# Patient Record
Sex: MALE | NOT HISPANIC OR LATINO | Employment: OTHER | URBAN - METROPOLITAN AREA
[De-identification: names, ages, dates, MRNs, and addresses within clinical notes are randomized per-mention and may not be internally consistent; named-entity substitution may affect disease eponyms.]

---

## 2023-10-04 PROBLEM — Z79.4 TYPE 2 DIABETES MELLITUS WITHOUT COMPLICATION, WITH LONG-TERM CURRENT USE OF INSULIN (MULTI): Status: ACTIVE | Noted: 2023-10-04

## 2023-10-04 PROBLEM — F84.0 AUTISTIC DISORDER (HHS-HCC): Status: ACTIVE | Noted: 2023-10-04

## 2023-10-04 PROBLEM — I10 BENIGN ESSENTIAL HYPERTENSION: Status: ACTIVE | Noted: 2023-10-04

## 2023-10-04 PROBLEM — E11.9 TYPE 2 DIABETES MELLITUS WITHOUT COMPLICATION, WITH LONG-TERM CURRENT USE OF INSULIN (MULTI): Status: ACTIVE | Noted: 2023-10-04

## 2023-10-04 RX ORDER — SIMVASTATIN 40 MG/1
40 TABLET, FILM COATED ORAL DAILY
COMMUNITY

## 2023-10-04 RX ORDER — MULTIVIT WITH MINERALS/HERBS
1 TABLET ORAL DAILY
COMMUNITY

## 2023-10-04 RX ORDER — METFORMIN HYDROCHLORIDE EXTENDED-RELEASE TABLETS 1000 MG/1
1000 TABLET, FILM COATED, EXTENDED RELEASE ORAL 2 TIMES DAILY
COMMUNITY

## 2023-10-04 RX ORDER — ASPIRIN 81 MG/1
81 TABLET ORAL DAILY
COMMUNITY

## 2023-10-04 RX ORDER — LISINOPRIL 10 MG/1
10 TABLET ORAL DAILY
COMMUNITY

## 2023-10-04 RX ORDER — FERROUS SULFATE 325(65) MG
325 TABLET ORAL DAILY
COMMUNITY

## 2023-10-04 RX ORDER — DAPAGLIFLOZIN 10 MG/1
10 TABLET, FILM COATED ORAL DAILY
COMMUNITY

## 2023-10-04 RX ORDER — METOPROLOL TARTRATE 50 MG/1
50 TABLET ORAL 2 TIMES DAILY
COMMUNITY

## 2023-10-04 RX ORDER — FENOFIBRATE 134 MG/1
CAPSULE ORAL
COMMUNITY
End: 2024-01-04 | Stop reason: WASHOUT

## 2023-10-04 RX ORDER — INSULIN DEGLUDEC 100 U/ML
7 INJECTION, SOLUTION SUBCUTANEOUS DAILY
COMMUNITY

## 2023-10-05 ENCOUNTER — APPOINTMENT (OUTPATIENT)
Dept: CARDIOLOGY | Facility: CLINIC | Age: 53
End: 2023-10-05
Payer: MEDICAID

## 2023-10-24 ENCOUNTER — CLINICAL SUPPORT (OUTPATIENT)
Dept: AUDIOLOGY | Facility: CLINIC | Age: 53
End: 2023-10-24
Payer: MEDICAID

## 2023-10-24 DIAGNOSIS — F84.0 AUTISM (HHS-HCC): ICD-10-CM

## 2023-10-24 DIAGNOSIS — R94.120 ABNORMAL OTOACOUSTIC EMISSIONS TEST: ICD-10-CM

## 2023-10-24 DIAGNOSIS — H91.90 HEARING LOSS, UNSPECIFIED HEARING LOSS TYPE, UNSPECIFIED LATERALITY: Primary | ICD-10-CM

## 2023-10-24 PROCEDURE — 92579 VISUAL AUDIOMETRY (VRA): CPT | Performed by: AUDIOLOGIST

## 2023-10-24 NOTE — LETTER
October 24, 2023     Romaine Berman MD  [address]  [address]  [facility] [address]    Patient: Kuldeep Dougherty   YOB: 1970   Date of Visit: 10/24/2023       Dear Dr. Romaine Berman MD:    Thank you for referring Kuldeep Dougherty to me for evaluation. Below are my notes for this consultation.  If you have questions, please do not hesitate to call me. I look forward to following your patient along with you.       Sincerely,     Sarah J Lombardo, SETH, CCC-A      CC: No Recipients  ______________________________________________________________________________________    Kuldeep Dougherty, age 52 years, is here today for a hearing re-evaluation. He is accompanied by a caregiver from his group home (diagnosed autism spectrum disorder).  Testing was attempted 9- via VRA (Visual Reinforcement Audiometry) without success.  Threshold ABR (Auditory Brainstem Response) was recommended if family/facility wanted to determine the type, configuration, and severity of hearing loss (the caregiver did not feel he would respond behaviorally). Caregiver also indicated that Kuldeep would not tolerate hearing aids if hearing loss was diagnosed.    Difficulty hearing - unsure  Tinnitus - unsure  Excessive noise exposure - unsure  Chronic ear infections - unsure  Ear pain - yes, both ears  Ear drainage - yes, excessive wax in both ears  Past ear surgery - unsure  Vertigo - no  Dizziness - no  Past hearing aid use - no  Family history - unsure    We do not have ABR equipment at this facility, so behavioral hearing evaluation was again attempted.    Appointment time: 9:30-10:15    Otoscopy revealed non-occluding wax bilaterally. Ears recently cleaned by Dr Berman.    Behavioral hearing evaluation revealed borderline normal to mild unspecified hearing loss in at least the better ear via VRA with fair reliability. Thresholds likely better than indicated as patient was not actively listening for the sounds, did not understand the  directions, and lost interest in stimuli quickly     Speech reception thresholds obtained at a normal levels bilaterally (15 dB HL).    Word discrimination:  Right ear - excellent (96%)  Left ear - excellent (96%)    Tympanometry:  Right ear - not tested (already performed 9-)  Left ear - not tested (already performed 9-)    Ipsilateral acoustic reflexes: not tested (already performed 9-)    Otoacoustic emissions:  Right ear - not tested (already performed 9-)  Left ear - not tested (already performed 9-)    Impressions:  Able to communicate via normal conversational speech based on speech audiometry scores (excellent word discrimination scores and normal speech reception thresholds bilaterally)    Recommendations:  1) Annual behavioral hearing evaluation to monitor.

## 2023-10-24 NOTE — PROGRESS NOTES
Kuldeep Dougherty, age 52 years, is here today for a hearing re-evaluation. He is accompanied by a caregiver from his group home (diagnosed autism spectrum disorder).  Testing was attempted 9- via VRA (Visual Reinforcement Audiometry) without success.  Threshold ABR (Auditory Brainstem Response) was recommended if family/facility wanted to determine the type, configuration, and severity of hearing loss (the caregiver did not feel he would respond behaviorally). Caregiver also indicated that Kuldeep would not tolerate hearing aids if hearing loss was diagnosed.    Difficulty hearing - unsure  Tinnitus - unsure  Excessive noise exposure - unsure  Chronic ear infections - unsure  Ear pain - yes, both ears  Ear drainage - yes, excessive wax in both ears  Past ear surgery - unsure  Vertigo - no  Dizziness - no  Past hearing aid use - no  Family history - unsure    We do not have ABR equipment at this facility, so behavioral hearing evaluation was again attempted.    Appointment time: 9:30-10:15    Otoscopy revealed non-occluding wax bilaterally. Ears recently cleaned by Dr Berman.    Behavioral hearing evaluation revealed borderline normal to mild unspecified hearing loss in at least the better ear via VRA with fair reliability. Thresholds likely better than indicated as patient was not actively listening for the sounds, did not understand the directions, and lost interest in stimuli quickly     Speech reception thresholds obtained at a normal levels bilaterally (15 dB HL).    Word discrimination:  Right ear - excellent (96%)  Left ear - excellent (96%)    Tympanometry:  Right ear - not tested (already performed 9-)  Left ear - not tested (already performed 9-)    Ipsilateral acoustic reflexes: not tested (already performed 9-)    Otoacoustic emissions:  Right ear - not tested (already performed 9-)  Left ear - not tested (already performed 9-)    Impressions:  Able to communicate via  normal conversational speech based on speech audiometry scores (excellent word discrimination scores and normal speech reception thresholds bilaterally)    Recommendations:  1) Annual behavioral hearing evaluation to monitor.

## 2023-10-26 ENCOUNTER — APPOINTMENT (OUTPATIENT)
Dept: CARDIOLOGY | Facility: CLINIC | Age: 53
End: 2023-10-26
Payer: MEDICAID

## 2023-11-01 ENCOUNTER — OFFICE VISIT (OUTPATIENT)
Dept: NEUROLOGY | Facility: CLINIC | Age: 53
End: 2023-11-01
Payer: MEDICAID

## 2023-11-01 VITALS
HEART RATE: 64 BPM | WEIGHT: 191 LBS | SYSTOLIC BLOOD PRESSURE: 130 MMHG | HEIGHT: 72 IN | BODY MASS INDEX: 25.87 KG/M2 | DIASTOLIC BLOOD PRESSURE: 74 MMHG

## 2023-11-01 DIAGNOSIS — F84.0 AUTISTIC DISORDER (HHS-HCC): ICD-10-CM

## 2023-11-01 DIAGNOSIS — E11.3293 TYPE 2 DIABETES MELLITUS WITH BOTH EYES AFFECTED BY MILD NONPROLIFERATIVE RETINOPATHY WITHOUT MACULAR EDEMA, WITHOUT LONG-TERM CURRENT USE OF INSULIN (MULTI): ICD-10-CM

## 2023-11-01 PROCEDURE — 3075F SYST BP GE 130 - 139MM HG: CPT | Performed by: PSYCHIATRY & NEUROLOGY

## 2023-11-01 PROCEDURE — 99203 OFFICE O/P NEW LOW 30 MIN: CPT | Performed by: PSYCHIATRY & NEUROLOGY

## 2023-11-01 PROCEDURE — 3078F DIAST BP <80 MM HG: CPT | Performed by: PSYCHIATRY & NEUROLOGY

## 2023-11-01 PROCEDURE — 4010F ACE/ARB THERAPY RXD/TAKEN: CPT | Performed by: PSYCHIATRY & NEUROLOGY

## 2023-11-01 PROCEDURE — 1036F TOBACCO NON-USER: CPT | Performed by: PSYCHIATRY & NEUROLOGY

## 2023-11-01 RX ORDER — ACETAMINOPHEN 325 MG/1
325 TABLET ORAL EVERY 4 HOURS PRN
COMMUNITY
Start: 2023-07-06

## 2023-11-01 RX ORDER — CHOLECALCIFEROL (VITAMIN D3) 25 MCG
1000 TABLET ORAL DAILY
COMMUNITY
Start: 2023-10-20

## 2023-11-01 RX ORDER — FENOFIBRATE 134 MG/1
134 CAPSULE ORAL DAILY
COMMUNITY

## 2023-11-01 RX ORDER — GLIMEPIRIDE 2 MG/1
2 TABLET ORAL 2 TIMES DAILY
COMMUNITY
Start: 2023-08-04

## 2023-11-01 ASSESSMENT — ENCOUNTER SYMPTOMS
COUGH: 0
CHILLS: 0
AGITATION: 0
FEVER: 0
CONSTIPATION: 0
FATIGUE: 0
DIFFICULTY URINATING: 0
EYE DISCHARGE: 0
ADENOPATHY: 0
DIAPHORESIS: 0
SHORTNESS OF BREATH: 0
WOUND: 0
POLYPHAGIA: 0
BACK PAIN: 0
DIARRHEA: 0
ARTHRALGIAS: 0

## 2023-11-01 ASSESSMENT — VISUAL ACUITY: VA_NORMAL: 1

## 2023-11-01 NOTE — PROGRESS NOTES
Consults  Chief complaints: Tremors.    History Of Present Illness  Kuldeep Dougherty is a 52 y.o. male presenting with autism and DM- 2.    Today he is her for episodic tremors but I am unable to appreciate any tremors or shaking. He is smiling and echolalia and no major deficits except he has chronic spastic arm movements.     Past Medical and Surgical History    Social History  Social History     Tobacco Use    Smoking status: Never    Smokeless tobacco: Never   Substance Use Topics    Alcohol use: Never    Drug use: Never     Allergies  Bactrim [sulfamethoxazole-trimethoprim]  (Not in a hospital admission)      Review of Systems   Constitutional:  Negative for chills, diaphoresis, fatigue and fever.   HENT:  Negative for congestion.    Eyes:  Negative for discharge.   Respiratory:  Negative for cough and shortness of breath.    Cardiovascular:  Negative for chest pain.   Gastrointestinal:  Negative for constipation and diarrhea.   Endocrine: Negative for polyphagia.   Genitourinary:  Negative for difficulty urinating.   Musculoskeletal:  Negative for arthralgias and back pain.   Skin:  Negative for pallor and wound.   Allergic/Immunologic: Negative for environmental allergies.   Hematological:  Negative for adenopathy.   Psychiatric/Behavioral:  Negative for agitation and behavioral problems.         Autism spectrum disorder     Face: agitation related face scratch marks.     Cardiovascular system: S1-S2 intact  Respiratory clear to auscultation bilateral on deep breathing he feels irritability on the left side of the chest.    Neurological Exam  Mental Status  Awake and alert. Patient is nonverbal. Follows two-step commands. Able to perform serial calculations.    Cranial Nerves  CN II: Visual acuity is normal. Visual fields full to confrontation.  CN III, IV, VI: Extraocular movements intact bilaterally. Normal lids and orbits bilaterally. Pupils equal round and reactive to light bilaterally.  CN V: Facial sensation  is normal.  CN VII: Full and symmetric facial movement.  CN VIII: Hearing is normal.  CN IX, X: Palate elevates symmetrically. Normal gag reflex.  CN XI: Shoulder shrug strength is normal.  CN XII: Tongue midline without atrophy or fasciculations.    Motor  Normal muscle bulk throughout. Increased muscle tone. Spasticity in upper and lower extremities. Strength is 5/5 throughout all four extremities.    Sensory  Light touch is normal in upper and lower extremities. Pinprick is normal in upper and lower extremities. Temperature is normal in upper and lower extremities. Vibration is normal in upper and lower extremities.     Reflexes                                            Right                      Left  Brachioradialis                    2+                         2+  Biceps                                 2+                         2+  Triceps                                2+                         2+  Patellar                                3+                         3+  Achilles                                2+                         2+    Coordination    Unable to follow .    Gait  Casual gait:  Spastic gait.        Last Recorded Vitals  Blood pressure 130/74, pulse 64, height 1.829 m (6'), weight 86.6 kg (191 lb).    Relevant Results      Current Outpatient Medications:     aspirin 81 mg EC tablet, 1 tablet (81 mg) once daily., Disp: , Rfl:     dapagliflozin propanediol (Farxiga) 10 mg, Take 1 tablet (10 mg) by mouth once daily., Disp: , Rfl:     ferrous sulfate (FerrouSuL) 325 (65 Fe) MG tablet, Take 1 tablet (325 mg) by mouth once daily., Disp: , Rfl:     insulin degludec (Tresiba U-100 Insulin) 100 unit/mL injection, Inject 7 Units under the skin once daily. Take as directed per insulin instructions., Disp: , Rfl:     fenofibrate micronized (Lofibra) 134 mg capsule, , Disp: , Rfl:     lisinopril 10 mg tablet, , Disp: , Rfl:     metFORMIN, OSM, (Fortamet) 1,000 mg 24 hr tablet, Do not crush, chew, or  split., Disp: , Rfl:     metoprolol tartrate (Lopressor) 50 mg tablet, , Disp: , Rfl:     simvastatin (Zocor) 40 mg tablet, , Disp: , Rfl:     vitamin B complex tablet, , Disp: , Rfl:      Continuous medications    PRN medications, reviewed                  I have personally reviewed the following imaging for brain (CT/ MR) and results and discussed in detail with the patient/care giver/family     Assessment/Plan     I am unable to appreciate any tremors today but we can see him again six month to see if symptoms are episodic or chronic and progressive     The health care giver -  feels when he get agitated he feel more shaking but not today. He should be seen by psych to get second opinion about anxiety or mood related shaking or tremors.    Followup with psychiatrist for mood disorder    Patient/Family Education: Extensive time was spent educating the patient on relevant anatomy, clinical findings and imaging, as well as discussing the potential diagnoses as discussed above.  Pharmacology: as above. Exercise: I discussed the importance of maintaining a daily exercise program, including stretching and strengthening. Preventative strategies were reviewed, specifically avoidance of any exercises that exacerbate pain.Return to online virtual visit/ clinic visit for follow-up with REESE Syed in 6 months or sooner as needed.The patient expressed understanding and agreement with the assessment and plan.  Patient encouraged to contact us should they have any questions, concerns, or any changes in symptoms. Thank you for allowing me to participate in the care of your patient.** This note is created using speech recognition transcription software. Despite proofreading, several typographical errors might be present that might affect the meaning of the content. Please call with any questions.**          Glen Young MD

## 2023-11-16 ENCOUNTER — APPOINTMENT (OUTPATIENT)
Dept: CARDIOLOGY | Facility: CLINIC | Age: 53
End: 2023-11-16
Payer: MEDICAID

## 2023-12-07 ENCOUNTER — APPOINTMENT (OUTPATIENT)
Dept: CARDIOLOGY | Facility: CLINIC | Age: 53
End: 2023-12-07
Payer: MEDICAID

## 2024-01-04 ENCOUNTER — OFFICE VISIT (OUTPATIENT)
Dept: CARDIOLOGY | Facility: CLINIC | Age: 54
End: 2024-01-04
Payer: MEDICAID

## 2024-01-04 VITALS
HEIGHT: 71 IN | WEIGHT: 186.6 LBS | RESPIRATION RATE: 16 BRPM | OXYGEN SATURATION: 96 % | BODY MASS INDEX: 26.12 KG/M2 | HEART RATE: 76 BPM | SYSTOLIC BLOOD PRESSURE: 108 MMHG | DIASTOLIC BLOOD PRESSURE: 76 MMHG

## 2024-01-04 DIAGNOSIS — I10 BENIGN ESSENTIAL HYPERTENSION: ICD-10-CM

## 2024-01-04 PROCEDURE — 3074F SYST BP LT 130 MM HG: CPT | Performed by: INTERNAL MEDICINE

## 2024-01-04 PROCEDURE — 99204 OFFICE O/P NEW MOD 45 MIN: CPT | Performed by: INTERNAL MEDICINE

## 2024-01-04 PROCEDURE — 3078F DIAST BP <80 MM HG: CPT | Performed by: INTERNAL MEDICINE

## 2024-01-04 PROCEDURE — 93005 ELECTROCARDIOGRAM TRACING: CPT | Performed by: INTERNAL MEDICINE

## 2024-01-04 PROCEDURE — 1036F TOBACCO NON-USER: CPT | Performed by: INTERNAL MEDICINE

## 2024-01-04 PROCEDURE — 4010F ACE/ARB THERAPY RXD/TAKEN: CPT | Performed by: INTERNAL MEDICINE

## 2024-01-04 PROCEDURE — 93010 ELECTROCARDIOGRAM REPORT: CPT | Performed by: INTERNAL MEDICINE

## 2024-01-04 PROCEDURE — 99214 OFFICE O/P EST MOD 30 MIN: CPT | Performed by: INTERNAL MEDICINE

## 2024-01-04 ASSESSMENT — ENCOUNTER SYMPTOMS
VOMITING: 0
MEMORY LOSS: 0
FEVER: 0
NAUSEA: 0
DIARRHEA: 0
DEPRESSION: 0
MYALGIAS: 0
CHILLS: 0
BLOATING: 0
HEMATURIA: 0
DYSURIA: 0
ALTERED MENTAL STATUS: 0
COUGH: 0
CONSTIPATION: 0
HEADACHES: 0
ABDOMINAL PAIN: 0
FALLS: 0
HEMOPTYSIS: 0
WHEEZING: 0

## 2024-01-04 NOTE — PROGRESS NOTES
Chief complaint:  New Patient Visit ([facility])     HPI  53 yo WM w h/o HTN, HLD, DM, autism now here for cardiology consult. No chest pain. No dyspnea at rest. No HERNANDEZ. No orthopnea/PND. No palps. No LH/dizzy/syncope. No edema. No claudication. No cough.   ECG 9/23: SR (67), normal    Review of Systems   Constitutional: Negative for chills, fever and malaise/fatigue.   HENT:  Negative for hearing loss.    Eyes:  Negative for visual disturbance.   Respiratory:  Negative for cough, hemoptysis and wheezing.    Skin:  Negative for rash.   Musculoskeletal:  Negative for falls and myalgias.   Gastrointestinal:  Negative for bloating, abdominal pain, constipation, diarrhea, dysphagia, nausea and vomiting.   Genitourinary:  Negative for dysuria and hematuria.   Neurological:  Negative for headaches.   Psychiatric/Behavioral:  Negative for altered mental status, depression and memory loss.         Social History     Tobacco Use    Smoking status: Never    Smokeless tobacco: Never   Substance Use Topics    Alcohol use: Never        Family History   Problem Relation Name Age of Onset    No Known Problems Mother      No Known Problems Father          Allergies   Allergen Reactions    Bactrim [Sulfamethoxazole-Trimethoprim] Unknown        Current Outpatient Medications   Medication Instructions    acetaminophen (TYLENOL) 325 mg, oral, Every 4 hours PRN    aspirin 81 mg, Daily    cholecalciferol (VITAMIN D-3) 1,000 Units, oral, Daily    dapagliflozin propanediol (FARXIGA) 10 mg, oral, Daily    fenofibrate micronized (Lofibra) 134 mg capsule No dose, route, or frequency recorded.    fenofibrate micronized (LOFIBRA) 134 mg, oral, Daily    ferrous sulfate (325 mg ferrous sulfate) (FERROUSUL) 325 mg, oral, Daily    glimepiride (AMARYL) 2 mg, oral, 2 times daily    insulin degludec (TRESIBA U-100 INSULIN) 7 Units, subcutaneous, Daily, Take as directed per insulin instructions.    lisinopril 10 mg, oral, Daily    metFORMIN  (OSM) (FORTAMET) 1,000 mg, oral, 2 times daily, Do not crush, chew, or split.    metoprolol tartrate (LOPRESSOR) 50 mg, oral, 2 times daily    simvastatin (ZOCOR) 40 mg, oral, Daily    vitamin B complex tablet 1 tablet, oral, Daily        Vitals:    01/04/24 1026   BP: 108/76   Pulse: 76   Resp: 16   SpO2: 96%        Physical Exam  Constitutional:       Appearance: Normal appearance.   HENT:      Head: Normocephalic and atraumatic.      Nose: Nose normal.   Neck:      Vascular: No carotid bruit.   Cardiovascular:      Rate and Rhythm: Normal rate and regular rhythm.      Heart sounds: No murmur heard.  Pulmonary:      Effort: Pulmonary effort is normal.      Breath sounds: Normal breath sounds.   Abdominal:      Palpations: Abdomen is soft.      Tenderness: There is no abdominal tenderness.   Musculoskeletal:      Right lower leg: No edema.      Left lower leg: No edema.   Skin:     General: Skin is warm and dry.   Neurological:      General: No focal deficit present.      Mental Status: He is alert.   Psychiatric:         Mood and Affect: Mood normal.     Labs  5/23 Cr 1.69, K 4.5, LFT nl  8/22 Cr 1.17, K 4.2  2/21 LDL 62    Assessment/Plan   53 yo WM w h/o HTN, HLD, DM, autism. Doing well. No sig cardiac symptoms. ECG normal. Can consider CT cardiac score to determine ASA need.   -continue Metoprolol 50 bid  -continue Lisinopril 10 every day  -continue Simva 40 every day  -FU MARY GRACE Hernandez MD

## 2024-01-08 LAB
ATRIAL RATE: 74 BPM
P AXIS: 67 DEGREES
P OFFSET: 187 MS
P ONSET: 138 MS
PR INTERVAL: 162 MS
Q ONSET: 219 MS
QRS COUNT: 13 BEATS
QRS DURATION: 88 MS
QT INTERVAL: 374 MS
QTC CALCULATION(BAZETT): 415 MS
QTC FREDERICIA: 401 MS
R AXIS: 64 DEGREES
T AXIS: 66 DEGREES
T OFFSET: 406 MS
VENTRICULAR RATE: 74 BPM

## 2024-05-01 ENCOUNTER — APPOINTMENT (OUTPATIENT)
Dept: NEUROLOGY | Facility: CLINIC | Age: 54
End: 2024-05-01
Payer: MEDICAID

## 2024-07-30 ENCOUNTER — APPOINTMENT (OUTPATIENT)
Dept: NEUROLOGY | Facility: CLINIC | Age: 54
End: 2024-07-30
Payer: MEDICAID

## 2024-07-30 VITALS
TEMPERATURE: 97.6 F | BODY MASS INDEX: 26.6 KG/M2 | SYSTOLIC BLOOD PRESSURE: 122 MMHG | DIASTOLIC BLOOD PRESSURE: 70 MMHG | HEART RATE: 74 BPM | WEIGHT: 190 LBS | HEIGHT: 71 IN

## 2024-07-30 DIAGNOSIS — F84.0 AUTISTIC DISORDER (HHS-HCC): Primary | ICD-10-CM

## 2024-07-30 PROCEDURE — 1036F TOBACCO NON-USER: CPT | Performed by: STUDENT IN AN ORGANIZED HEALTH CARE EDUCATION/TRAINING PROGRAM

## 2024-07-30 PROCEDURE — 99213 OFFICE O/P EST LOW 20 MIN: CPT | Performed by: STUDENT IN AN ORGANIZED HEALTH CARE EDUCATION/TRAINING PROGRAM

## 2024-07-30 PROCEDURE — 3008F BODY MASS INDEX DOCD: CPT | Performed by: STUDENT IN AN ORGANIZED HEALTH CARE EDUCATION/TRAINING PROGRAM

## 2024-07-30 PROCEDURE — 3078F DIAST BP <80 MM HG: CPT | Performed by: STUDENT IN AN ORGANIZED HEALTH CARE EDUCATION/TRAINING PROGRAM

## 2024-07-30 PROCEDURE — 3074F SYST BP LT 130 MM HG: CPT | Performed by: STUDENT IN AN ORGANIZED HEALTH CARE EDUCATION/TRAINING PROGRAM

## 2024-07-30 PROCEDURE — 4010F ACE/ARB THERAPY RXD/TAKEN: CPT | Performed by: STUDENT IN AN ORGANIZED HEALTH CARE EDUCATION/TRAINING PROGRAM

## 2024-07-30 RX ORDER — MELATONIN 5 MG
5 CAPSULE ORAL DAILY
COMMUNITY

## 2024-07-30 NOTE — PROGRESS NOTES
Date of Service: 7/30/2024  Patient: Kuldeep Dougherty  MRN: [id number]  Referring Provider: No ref. provider found  PCP: Hammad Callejas MD    History of Present Illness:   Mr. Dougherty is a 53 y.o. male who presents to neurology clinic for follow up of tremors. Kuldeep Dougherty's past medical history is pertinent for autism and diabetes type 2. He lives at a group home ([facility]) in [place]. He comes with an aid. He previously saw Dr. Young    History is limited from the patient due to autism. His aid reports not noticing any tremors. She has known him for 4-5 months. No concerns at the group home. He has echolalia and repeats motions. He has been walking fine.     Review of Systems:  The systems were reviewed with pertinent positives and negatives documented in the HPI.      Past Medical & Surgical History  No past medical history on file.  No past surgical history on file.    Social History:   Social History     Tobacco Use    Smoking status: Never    Smokeless tobacco: Never   Substance Use Topics    Alcohol use: Never     Medications:     Current Outpatient Medications:     acetaminophen (Tylenol) 325 mg tablet, Take 1 tablet (325 mg) by mouth every 4 hours if needed., Disp: , Rfl:     aspirin 81 mg EC tablet, 1 tablet (81 mg) once daily., Disp: , Rfl:     cholecalciferol (Vitamin D-3) 25 MCG (1000 UT) tablet, Take 1 tablet (1,000 Units) by mouth once daily., Disp: , Rfl:     dapagliflozin propanediol (Farxiga) 10 mg, Take 1 tablet (10 mg) by mouth once daily., Disp: , Rfl:     fenofibrate micronized (Lofibra) 134 mg capsule, Take 1 capsule (134 mg) by mouth once daily., Disp: , Rfl:     ferrous sulfate (FerrouSuL) 325 (65 Fe) MG tablet, Take 1 tablet by mouth once daily., Disp: , Rfl:     glimepiride (Amaryl) 2 mg tablet, Take 1 tablet (2 mg) by mouth twice a day., Disp: , Rfl:     insulin degludec (Tresiba U-100 Insulin) 100 unit/mL injection, Inject 7 Units under the skin once daily. Take as directed per insulin  instructions., Disp: , Rfl:     lisinopril 10 mg tablet, Take 1 tablet (10 mg) by mouth once daily., Disp: , Rfl:     metFORMIN, OSM, (Fortamet) 1,000 mg 24 hr tablet, Take 1 tablet (1,000 mg) by mouth 2 times a day. Do not crush, chew, or split., Disp: , Rfl:     metoprolol tartrate (Lopressor) 50 mg tablet, Take 1 tablet by mouth 2 times a day., Disp: , Rfl:     simvastatin (Zocor) 40 mg tablet, Take 1 tablet (40 mg) by mouth once daily., Disp: , Rfl:     vitamin B complex tablet, Take 1 tablet by mouth once daily., Disp: , Rfl:      General Physical Exam:  There were no vitals taken for this visit.     He looks well and is not in any acute distress. Breathing comfortably on room air.     Neurological Exam:   Mental status reveals him to be alert and oriented to person only. echolalia. Mimics movements      Cranial nerves:  Blinks to threat  Pupils round, 4 mm in diameter, equally reactive to light. Lids symmetric; no ptosis. EOMs normal alignment, full range.   Normal and symmetric facial strength. Nasolabial folds symmetric.   Hearing intact to conversation.   Tongue midline, with normal bulk and strength; no fasciculations.      Motor:    Strength is 5/5 throughout all four extremities.      Sensory:   Light Touch: Normal in all extremities     Coordination:  In both upper extremities, finger-nose-finger was intact without dysmetria or overshoot.   In both lower extremities, heel-to-shin was intact. MACK were intact in both upper and lower extremities.      Gait:  Station was stable with a normal base. Gait was stable with a normal arm swing and speed. No ataxia, shuffling, steppage or waddling was present. No circumduction was present.     Results:    Lab Results   Component Value Date    HGBA1C 6.0 05/19/2023     Impression:  Kuldeep Dougherty is a 53 y.o. who presents for follow up of tremors. He has echolalia, but I do not appreciate any tremors today, which is similar to Dr. Young's previous evaluation. No other  concerning findings on neurological exam.    He can follow up as needed.     Reviewed and approved by DHRUV PEREZ on 7/30/24 at 7:35 AM.    I personally spent 20 minutes on the day of the visit completing the review of the medical record and outside records, obtaining history and performing an appropriate physical exam, patient care, counseling and education, placing orders, independently reviewing results, communicating with the patient, coordinating care and performing appropriate clinical documentation.

## 2024-07-30 NOTE — PATIENT INSTRUCTIONS
It was a pleasure seeing you today.    I do not see any tremors or other concerning signs on your exam. No further neurology follow up is needed at this time.    If you have any questions or concerns please call my office at [phone].

## 2024-10-21 ENCOUNTER — APPOINTMENT (OUTPATIENT)
Dept: AUDIOLOGY | Facility: CLINIC | Age: 54
End: 2024-10-21
Payer: MEDICAID